# Patient Record
Sex: FEMALE | Race: WHITE | NOT HISPANIC OR LATINO
[De-identification: names, ages, dates, MRNs, and addresses within clinical notes are randomized per-mention and may not be internally consistent; named-entity substitution may affect disease eponyms.]

---

## 2017-02-09 ENCOUNTER — RECORDS - HEALTHEAST (OUTPATIENT)
Dept: ADMINISTRATIVE | Facility: OTHER | Age: 62
End: 2017-02-09

## 2017-02-14 ENCOUNTER — OFFICE VISIT - HEALTHEAST (OUTPATIENT)
Dept: FAMILY MEDICINE | Facility: CLINIC | Age: 62
End: 2017-02-14

## 2017-02-14 DIAGNOSIS — J01.00 ACUTE NON-RECURRENT MAXILLARY SINUSITIS: ICD-10-CM

## 2017-02-14 ASSESSMENT — MIFFLIN-ST. JEOR: SCORE: 1616.15

## 2017-04-18 ENCOUNTER — OFFICE VISIT - HEALTHEAST (OUTPATIENT)
Dept: FAMILY MEDICINE | Facility: CLINIC | Age: 62
End: 2017-04-18

## 2017-04-18 DIAGNOSIS — N39.0 UTI (URINARY TRACT INFECTION): ICD-10-CM

## 2017-04-18 ASSESSMENT — MIFFLIN-ST. JEOR: SCORE: 1210.18

## 2017-08-10 ENCOUNTER — RECORDS - HEALTHEAST (OUTPATIENT)
Dept: ADMINISTRATIVE | Facility: OTHER | Age: 62
End: 2017-08-10

## 2018-02-26 ENCOUNTER — RECORDS - HEALTHEAST (OUTPATIENT)
Dept: ADMINISTRATIVE | Facility: OTHER | Age: 63
End: 2018-02-26

## 2018-08-13 ENCOUNTER — RECORDS - HEALTHEAST (OUTPATIENT)
Dept: ADMINISTRATIVE | Facility: OTHER | Age: 63
End: 2018-08-13

## 2019-02-12 ENCOUNTER — COMMUNICATION - HEALTHEAST (OUTPATIENT)
Dept: FAMILY MEDICINE | Facility: CLINIC | Age: 64
End: 2019-02-12

## 2021-05-25 ENCOUNTER — RECORDS - HEALTHEAST (OUTPATIENT)
Dept: ADMINISTRATIVE | Facility: CLINIC | Age: 66
End: 2021-05-25

## 2021-05-26 ENCOUNTER — RECORDS - HEALTHEAST (OUTPATIENT)
Dept: ADMINISTRATIVE | Facility: CLINIC | Age: 66
End: 2021-05-26

## 2021-05-30 VITALS — WEIGHT: 225 LBS | BODY MASS INDEX: 34.1 KG/M2 | HEIGHT: 68 IN

## 2021-05-30 VITALS — HEIGHT: 68 IN | BODY MASS INDEX: 20.54 KG/M2 | WEIGHT: 135.5 LBS

## 2021-06-01 ENCOUNTER — RECORDS - HEALTHEAST (OUTPATIENT)
Dept: ADMINISTRATIVE | Facility: CLINIC | Age: 66
End: 2021-06-01

## 2021-06-08 NOTE — PROGRESS NOTES
Subjective:   Oliva comes in with a 6 week history of head congestion and headaches.  She's had a little bit of dizziness as well.  Her ears of been popping and plugging at times.  Quick head movements brings on the dizziness.  She's had a little bit of a sore throat.  This seems to hurt more in the morning after multiple breathing at nighttime.  She finds it difficult to breathe through the nose.  Her headaches have been anterior in nature.  They are pounding in character.  She does have a productive cough.  Cough is productive of a yellow phlegm.  She's had some chills but does not know she's really running fevers.  She denies nausea or vomiting.  There has been no diarrhea.      Objective:  HEENT: Both TMs are gray.  No erythema noted.  The sinuses are tender over both maxillary areas.  Frontal areas are nontender.  Conjunctivae are clear.  The nasal mucosa is boggy and inflamed.  Decreased air flow noted to the nares.  The pharynx has minimal erythema.  No exudate noted.  Neck: Neck reveals no lymphadenopathy.  Lungs: Lungs are clear.  Patient's in no respiratory distress.  Cardiac: No murmur heard today.  Pulse was regular.  Abdomen: Abdomen is soft and nontender.      Assessment:  1.  Maxillary sinusitis  2.  Headache secondary to #1      Plan:  The patient will be started on Augmentin 875 mg twice daily due to the prolonged nature of her symptoms..  She will take extra liquids.  Start Mucinex for mucus control.  Use Tylenol or ibuprofen for headache control.  Follow-up here if symptoms would persist.

## 2021-06-10 NOTE — PROGRESS NOTES
"Assessment:   1. UTI (urinary tract infection)  Informed patient that if she continuous to bleed after treatment with antibiotic she should contact her PCP or her urologist.  - Urinalysis-UC if Indicated  - sulfamethoxazole-trimethoprim (BACTRIM DS) 800-160 mg per tablet; Take 1 tablet by mouth 2 (two) times a day for 5 days.  Dispense: 10 tablet; Refill: 0     Plan:   Medications: TMP/SMX.  Maintain adequate hydration.  Follow up if symptoms not improving, and as needed.     Subjective:   Oliva Alexis is a 61 y.o. female who complains of burning with urination, frequency, hematuria and urgency. She has had symptoms for 1 day. Patient also complains of blood in the urine and bleeding this morning. Patient denies back pain, congestion, cough, fever, headache, rhinitis and sorethroat. Patient does have history of ovarian cancer in 2013 and hysterectomy. Patient does not have a history of pyelonephritis.     The following portions of the patient's history were reviewed and updated as appropriate:   She  has a past medical history of Ovarian cancer (Aug 2013).  She  does not have any pertinent problems on file.  Her family history includes Arthritis in her brother, brother, father, and mother; Heart disease in her father and mother; Hypertension in her mother; Kidney disease in her mother; Lactose intolerance in her sister, sister, and sister; Mental illness in her mother and sister.  She  reports that she has never smoked. She has never used smokeless tobacco. She reports that she drinks alcohol. She reports that she does not use illicit drugs.  No current outpatient prescriptions on file prior to visit.     No current facility-administered medications on file prior to visit.      She has No Known Allergies..    Review of Systems  A 12 point comprehensive review of systems was negative except as noted.      Objective:      /88  Pulse 72  Temp 97.9  F (36.6  C) (Oral)   Ht 5' 7.5\" (1.715 m)  Wt 135 lb 8 oz " (61.5 kg)  SpO2 98%  BMI 20.91 kg/m2  General appearance: alert, appears stated age and cooperative  Head: Normocephalic, without obvious abnormality, atraumatic  Eyes: conjunctivae/corneas clear. PERRL, EOM's intact. Fundi benign.  Back: symmetric, no curvature. ROM normal. No CVA tenderness.  Abdomen: abnormal findings:  moderate tenderness in the periumbilical area  Pelvic: cervix normal in appearance, external genitalia normal, no adnexal masses or tenderness, no cervical motion tenderness, rectovaginal septum normal, uterus normal size, shape, and consistency and vagina normal without discharge  Pulses: 2+ and symmetric  Skin: Skin color, texture, turgor normal. No rashes or lesions  Lymph nodes: Cervical, supraclavicular, and axillary nodes normal.  Neurologic: Grossly normal    Laboratory:   Urine dipstick: 3+ for leukocyte esterase.    Micro exam: not done.

## 2021-07-21 ENCOUNTER — RECORDS - HEALTHEAST (OUTPATIENT)
Dept: ADMINISTRATIVE | Facility: CLINIC | Age: 66
End: 2021-07-21

## 2021-07-22 ENCOUNTER — RECORDS - HEALTHEAST (OUTPATIENT)
Dept: FAMILY MEDICINE | Facility: CLINIC | Age: 66
End: 2021-07-22

## 2021-07-22 DIAGNOSIS — Z12.31 OTHER SCREENING MAMMOGRAM: ICD-10-CM

## 2021-08-15 ENCOUNTER — HEALTH MAINTENANCE LETTER (OUTPATIENT)
Age: 66
End: 2021-08-15

## 2021-10-11 ENCOUNTER — HEALTH MAINTENANCE LETTER (OUTPATIENT)
Age: 66
End: 2021-10-11

## 2022-09-25 ENCOUNTER — HEALTH MAINTENANCE LETTER (OUTPATIENT)
Age: 67
End: 2022-09-25

## 2023-10-14 ENCOUNTER — HEALTH MAINTENANCE LETTER (OUTPATIENT)
Age: 68
End: 2023-10-14

## 2025-03-16 NOTE — TELEPHONE ENCOUNTER
The patient is due to establish care with a new primary care provider as Dr. Gallo Lou is no longer practicing at the [facility]. CMT attempting to reach the patient to schedule him/her with a new PCP at [facility].   The patient is now being seen in [place].  
1